# Patient Record
Sex: FEMALE | Race: WHITE
[De-identification: names, ages, dates, MRNs, and addresses within clinical notes are randomized per-mention and may not be internally consistent; named-entity substitution may affect disease eponyms.]

---

## 2019-06-18 ENCOUNTER — HOSPITAL ENCOUNTER (OUTPATIENT)
Dept: HOSPITAL 95 - LAB SHORT | Age: 79
Discharge: HOME | End: 2019-06-18
Attending: PHYSICIAN ASSISTANT
Payer: MEDICARE

## 2019-06-18 DIAGNOSIS — N39.0: Primary | ICD-10-CM

## 2020-06-16 ENCOUNTER — HOSPITAL ENCOUNTER (OUTPATIENT)
Dept: HOSPITAL 95 - LAB | Age: 80
Discharge: HOME | End: 2020-06-16
Attending: NURSE PRACTITIONER
Payer: MEDICARE

## 2020-06-16 DIAGNOSIS — R30.9: Primary | ICD-10-CM

## 2020-10-14 ENCOUNTER — HOSPITAL ENCOUNTER (OUTPATIENT)
Dept: HOSPITAL 95 - LAB SHORT | Age: 80
Discharge: HOME | End: 2020-10-14
Attending: PHYSICIAN ASSISTANT
Payer: MEDICARE

## 2020-10-14 DIAGNOSIS — R42: Primary | ICD-10-CM

## 2020-10-14 LAB
ANION GAP SERPL CALCULATED.4IONS-SCNC: 8 MMOL/L (ref 6–16)
BASOPHILS # BLD AUTO: 0.02 K/MM3 (ref 0–0.23)
BASOPHILS NFR BLD AUTO: 0 % (ref 0–2)
BUN SERPL-MCNC: 15 MG/DL (ref 8–24)
CALCIUM SERPL-MCNC: 9.9 MG/DL (ref 8.5–10.1)
CHLORIDE SERPL-SCNC: 89 MMOL/L (ref 98–108)
CO2 SERPL-SCNC: 29 MMOL/L (ref 21–32)
CREAT SERPL-MCNC: 0.68 MG/DL (ref 0.4–1)
DEPRECATED RDW RBC AUTO: 39.7 FL (ref 35.1–46.3)
EOSINOPHIL # BLD AUTO: 0.08 K/MM3 (ref 0–0.68)
EOSINOPHIL NFR BLD AUTO: 1 % (ref 0–6)
ERYTHROCYTE [DISTWIDTH] IN BLOOD BY AUTOMATED COUNT: 12.6 % (ref 11.7–14.2)
GLUCOSE SERPL-MCNC: 100 MG/DL (ref 70–99)
HCT VFR BLD AUTO: 41.7 % (ref 33–51)
HGB BLD-MCNC: 14.9 G/DL (ref 11.5–16)
IMM GRANULOCYTES # BLD AUTO: 0.05 K/MM3 (ref 0–0.1)
IMM GRANULOCYTES NFR BLD AUTO: 0 % (ref 0–1)
LYMPHOCYTES # BLD AUTO: 1.35 K/MM3 (ref 0.84–5.2)
LYMPHOCYTES NFR BLD AUTO: 12 % (ref 21–46)
MCHC RBC AUTO-ENTMCNC: 35.7 G/DL (ref 31.5–36.5)
MCV RBC AUTO: 86 FL (ref 80–100)
MONOCYTES # BLD AUTO: 0.79 K/MM3 (ref 0.16–1.47)
MONOCYTES NFR BLD AUTO: 7 % (ref 4–13)
NEUTROPHILS # BLD AUTO: 9.3 K/MM3 (ref 1.96–9.15)
NEUTROPHILS NFR BLD AUTO: 80 % (ref 41–73)
NRBC # BLD AUTO: 0 K/MM3 (ref 0–0.02)
NRBC BLD AUTO-RTO: 0 /100 WBC (ref 0–0.2)
PLATELET # BLD AUTO: 204 K/MM3 (ref 150–400)
POTASSIUM SERPL-SCNC: 3 MMOL/L (ref 3.5–5.5)
SODIUM SERPL-SCNC: 126 MMOL/L (ref 136–145)
TROPONIN I SERPL-MCNC: <0.017 NG/ML (ref 0–0.04)

## 2022-07-10 ENCOUNTER — HOSPITAL ENCOUNTER (INPATIENT)
Dept: HOSPITAL 95 - ER | Age: 82
LOS: 4 days | Discharge: NURSING, REHAB OR LONG-TERM CARE | DRG: 536 | End: 2022-07-14
Attending: INTERNAL MEDICINE | Admitting: INTERNAL MEDICINE
Payer: MEDICARE

## 2022-07-10 VITALS — WEIGHT: 161.82 LBS | HEIGHT: 63 IN | BODY MASS INDEX: 28.67 KG/M2

## 2022-07-10 DIAGNOSIS — E03.2: ICD-10-CM

## 2022-07-10 DIAGNOSIS — Z88.0: ICD-10-CM

## 2022-07-10 DIAGNOSIS — K21.9: ICD-10-CM

## 2022-07-10 DIAGNOSIS — D64.9: ICD-10-CM

## 2022-07-10 DIAGNOSIS — M71.9: ICD-10-CM

## 2022-07-10 DIAGNOSIS — I10: ICD-10-CM

## 2022-07-10 DIAGNOSIS — Z79.899: ICD-10-CM

## 2022-07-10 DIAGNOSIS — I95.1: ICD-10-CM

## 2022-07-10 DIAGNOSIS — G25.81: ICD-10-CM

## 2022-07-10 DIAGNOSIS — S32.592A: ICD-10-CM

## 2022-07-10 DIAGNOSIS — F32.A: ICD-10-CM

## 2022-07-10 DIAGNOSIS — I71.2: ICD-10-CM

## 2022-07-10 DIAGNOSIS — E03.9: ICD-10-CM

## 2022-07-10 DIAGNOSIS — Z88.8: ICD-10-CM

## 2022-07-10 DIAGNOSIS — Z98.41: ICD-10-CM

## 2022-07-10 DIAGNOSIS — Z20.822: ICD-10-CM

## 2022-07-10 DIAGNOSIS — Z98.51: ICD-10-CM
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
## 2022-07-10 DIAGNOSIS — M06.052: ICD-10-CM

## 2022-07-10 DIAGNOSIS — T40.2X5A: ICD-10-CM

## 2022-07-10 DIAGNOSIS — E78.5: ICD-10-CM

## 2022-07-10 DIAGNOSIS — S32.512A: Primary | ICD-10-CM

## 2022-07-10 DIAGNOSIS — E06.4: ICD-10-CM

## 2022-07-10 DIAGNOSIS — G89.29: ICD-10-CM

## 2022-07-10 DIAGNOSIS — Z90.49: ICD-10-CM

## 2022-07-10 DIAGNOSIS — E87.6: ICD-10-CM

## 2022-07-10 DIAGNOSIS — F41.9: ICD-10-CM

## 2022-07-10 DIAGNOSIS — N39.0: ICD-10-CM

## 2022-07-10 DIAGNOSIS — R11.0: ICD-10-CM

## 2022-07-10 DIAGNOSIS — R32: ICD-10-CM

## 2022-07-10 DIAGNOSIS — M81.0: ICD-10-CM

## 2022-07-10 DIAGNOSIS — E87.1: ICD-10-CM

## 2022-07-10 DIAGNOSIS — B96.20: ICD-10-CM

## 2022-07-10 DIAGNOSIS — Z87.891: ICD-10-CM

## 2022-07-10 LAB
PROT UR STRIP-MCNC: (no result) MG/DL
RBC #/AREA URNS HPF: (no result) /HPF (ref 0–2)
SP GR SPEC: 1.01 (ref 1–1.02)
URN SPEC COLLECT METH UR: (no result)
UROBILINOGEN UR STRIP-MCNC: (no result) MG/DL
WBC #/AREA URNS HPF: (no result) /HPF (ref 0–5)

## 2022-07-10 PROCEDURE — G0378 HOSPITAL OBSERVATION PER HR: HCPCS

## 2022-07-10 PROCEDURE — A9270 NON-COVERED ITEM OR SERVICE: HCPCS

## 2022-07-11 LAB
ALBUMIN SERPL BCP-MCNC: 2.9 G/DL (ref 3.4–5)
ALBUMIN/GLOB SERPL: 0.9 {RATIO} (ref 0.8–1.8)
ALT SERPL W P-5'-P-CCNC: 27 U/L (ref 12–78)
ANION GAP SERPL CALCULATED.4IONS-SCNC: 10 MMOL/L (ref 6–16)
ANION GAP SERPL CALCULATED.4IONS-SCNC: 6 MMOL/L (ref 6–16)
AST SERPL W P-5'-P-CCNC: 22 U/L (ref 12–37)
BASOPHILS # BLD AUTO: 0.01 K/MM3 (ref 0–0.23)
BASOPHILS NFR BLD AUTO: 0 % (ref 0–2)
BILIRUB SERPL-MCNC: 0.9 MG/DL (ref 0.1–1)
BUN SERPL-MCNC: 11 MG/DL (ref 8–24)
BUN SERPL-MCNC: 12 MG/DL (ref 8–24)
CALCIUM SERPL-MCNC: 9.3 MG/DL (ref 8.5–10.1)
CALCIUM SERPL-MCNC: 9.3 MG/DL (ref 8.5–10.1)
CHLORIDE SERPL-SCNC: 84 MMOL/L (ref 98–108)
CHLORIDE SERPL-SCNC: 84 MMOL/L (ref 98–108)
CO2 SERPL-SCNC: 27 MMOL/L (ref 21–32)
CO2 SERPL-SCNC: 29 MMOL/L (ref 21–32)
CREAT SERPL-MCNC: 0.45 MG/DL (ref 0.4–1)
CREAT SERPL-MCNC: 0.5 MG/DL (ref 0.4–1)
DEPRECATED RDW RBC AUTO: 41.2 FL (ref 35.1–46.3)
EOSINOPHIL # BLD AUTO: 0.02 K/MM3 (ref 0–0.68)
EOSINOPHIL NFR BLD AUTO: 0 % (ref 0–6)
ERYTHROCYTE [DISTWIDTH] IN BLOOD BY AUTOMATED COUNT: 13.2 % (ref 11.7–14.2)
GLOBULIN SER CALC-MCNC: 3.2 G/DL (ref 2.2–4)
GLUCOSE SERPL-MCNC: 125 MG/DL (ref 70–99)
GLUCOSE SERPL-MCNC: 142 MG/DL (ref 70–99)
HCT VFR BLD AUTO: 33.4 % (ref 33–51)
HGB BLD-MCNC: 12.1 G/DL (ref 11.5–16)
IMM GRANULOCYTES # BLD AUTO: 0.05 K/MM3 (ref 0–0.1)
IMM GRANULOCYTES NFR BLD AUTO: 1 % (ref 0–1)
LYMPHOCYTES # BLD AUTO: 0.66 K/MM3 (ref 0.84–5.2)
LYMPHOCYTES NFR BLD AUTO: 8 % (ref 21–46)
MAGNESIUM SERPL-MCNC: 1.5 MG/DL (ref 1.6–2.4)
MCHC RBC AUTO-ENTMCNC: 36.2 G/DL (ref 31.5–36.5)
MCV RBC AUTO: 86 FL (ref 80–100)
MONOCYTES # BLD AUTO: 0.45 K/MM3 (ref 0.16–1.47)
MONOCYTES NFR BLD AUTO: 6 % (ref 4–13)
NEUTROPHILS # BLD AUTO: 7 K/MM3 (ref 1.96–9.15)
NEUTROPHILS NFR BLD AUTO: 86 % (ref 41–73)
NRBC # BLD AUTO: 0 K/MM3 (ref 0–0.02)
NRBC BLD AUTO-RTO: 0 /100 WBC (ref 0–0.2)
OSMOLALITY SERPL: 249 MOS/KG (ref 275–300)
PLATELET # BLD AUTO: 155 K/MM3 (ref 150–400)
POTASSIUM SERPL-SCNC: 3 MMOL/L (ref 3.5–5.5)
POTASSIUM SERPL-SCNC: 3.4 MMOL/L (ref 3.5–5.5)
PROT SERPL-MCNC: 6.1 G/DL (ref 6.4–8.2)
SODIUM SERPL-SCNC: 119 MMOL/L (ref 136–145)
SODIUM SERPL-SCNC: 121 MMOL/L (ref 136–145)
TSH SERPL DL<=0.005 MIU/L-ACNC: 0.18 UIU/ML (ref 0.36–4.8)

## 2022-07-11 NOTE — NUR
SHIFT SUMMARY: PATIENT REPORTS PAIN ONLY WITH ACTIVITY AND REFUSED TYLENOL.
ZOFRAN WAS GIVEN FOR NAUSEA WITH GOOD EFFECT. EGG CRATE WAS PLACED ON THE BED
TO PREVENT SKIN BREAKDOWN. REDDNESS IS OBSERVED UNDER BREASTS.

## 2022-07-11 NOTE — NUR
SHIFT SUMMARY
S/P PUBIC FX, A/O X4, VSS, TOLERATING PO, NOT ABLE TO AMBULATE TODAY WITH
PT/OT R/T PAIN. REBOLLEDO IN PLACE DRAINING DARK YELLOW URINE, 2 ATTEMPTS TO HAVE
A BM ON A BEDPAN WITH NO SUCCESS. ABLE TO MAKE NEEDS KNOWN. CALL LIGHT IN
REACH, WILL CTM AND REPORT TO ONCOMING NOC RN.

## 2022-07-11 NOTE — NUR
ADMISSION: LATE ENTRY FOR 07/10/22 @ 2100: PATIENT IS RECIEVED FROM ER VIA
STRETCHER. A LATERAL TRANSFER WITH ASSIST OF 4 TO THE BED. PATIENT REPORTS
NAUSEA. PAIN ONLY WITH ACTIVITY. REBOLLEDO IS IN PLACE. PATIENT IS ORIENTED TO
ROOM AND CALL BELL.

## 2022-07-12 LAB
ANION GAP SERPL CALCULATED.4IONS-SCNC: 7 MMOL/L (ref 6–16)
BASOPHILS # BLD AUTO: 0.01 K/MM3 (ref 0–0.23)
BASOPHILS NFR BLD AUTO: 0 % (ref 0–2)
BUN SERPL-MCNC: 9 MG/DL (ref 8–24)
CALCIUM SERPL-MCNC: 9.4 MG/DL (ref 8.5–10.1)
CHLORIDE SERPL-SCNC: 87 MMOL/L (ref 98–108)
CO2 SERPL-SCNC: 29 MMOL/L (ref 21–32)
CREAT SERPL-MCNC: 0.49 MG/DL (ref 0.4–1)
DEPRECATED RDW RBC AUTO: 43.1 FL (ref 35.1–46.3)
EOSINOPHIL # BLD AUTO: 0.08 K/MM3 (ref 0–0.68)
EOSINOPHIL NFR BLD AUTO: 1 % (ref 0–6)
ERYTHROCYTE [DISTWIDTH] IN BLOOD BY AUTOMATED COUNT: 13.3 % (ref 11.7–14.2)
GLUCOSE SERPL-MCNC: 110 MG/DL (ref 70–99)
HCT VFR BLD AUTO: 30.8 % (ref 33–51)
HGB BLD-MCNC: 10.7 G/DL (ref 11.5–16)
IMM GRANULOCYTES # BLD AUTO: 0.06 K/MM3 (ref 0–0.1)
IMM GRANULOCYTES NFR BLD AUTO: 1 % (ref 0–1)
LYMPHOCYTES # BLD AUTO: 0.93 K/MM3 (ref 0.84–5.2)
LYMPHOCYTES NFR BLD AUTO: 13 % (ref 21–46)
MAGNESIUM SERPL-MCNC: 2.1 MG/DL (ref 1.6–2.4)
MCHC RBC AUTO-ENTMCNC: 34.7 G/DL (ref 31.5–36.5)
MCV RBC AUTO: 88 FL (ref 80–100)
MONOCYTES # BLD AUTO: 0.64 K/MM3 (ref 0.16–1.47)
MONOCYTES NFR BLD AUTO: 9 % (ref 4–13)
NEUTROPHILS # BLD AUTO: 5.51 K/MM3 (ref 1.96–9.15)
NEUTROPHILS NFR BLD AUTO: 76 % (ref 41–73)
NRBC # BLD AUTO: 0 K/MM3 (ref 0–0.02)
NRBC BLD AUTO-RTO: 0 /100 WBC (ref 0–0.2)
PHOSPHATE SERPL-MCNC: 2.4 MG/DL (ref 2.5–4.9)
PLATELET # BLD AUTO: 144 K/MM3 (ref 150–400)
POTASSIUM SERPL-SCNC: 3.6 MMOL/L (ref 3.5–5.5)
SODIUM SERPL-SCNC: 123 MMOL/L (ref 136–145)

## 2022-07-12 NOTE — NUR
SHIFT SUMMARY
 
PATIENT IS ALERT AND ORIENTED. PATIENT HAS BEEN PLEASENT AND COOPERATIVE WITH
CARE. PATIENT HAS HAD NO ACUTE EVENTS THIS SHIFT. PATIENT HAS A REBOLLEDO DRAINING
PRANAY URINE TO GRAVITY. PATIENT WAS ADMITTED FOR HIP FX. PATIENT REPORTS PAIN
WITH MOVEMENT. PATIENT HAS NOT COMPLAINED OF PAIN, NAUSEA, SOB OR VOMITTING
THIS SHIFT. PATIENT IS ABLE TO MAKE NEEDS KNOWN. PATIENTS  VISITED FOR
AWHILE THIS MORNING. VITAL SIGNS REVIEWED. CALL LIGHT IN PLACE. BED IN LOWEST
AND LOCKED POSITION. WILL MONITOR UNTIL SHIFT CHANGE.

## 2022-07-12 NOTE — NUR
SHIFT SUMMARY: PATIENT REPORTS PAIN 8/10 WITH ACTIVITY BUT 0 PAIN WHILE LYING
STILL. TYLENOL WAS GIVEN WITH GOOD EFFECT X2. REDDNESS BETWEEN BREASTS IS
TREATED WITH ANTIFUGAL POWDER. REBOLLEDO IS DRAINING CLEAR YELLOW URINE. SODIUM
PILL GIVEN AT HS CAUSED INCREASE NAUSEA. DR JAIN WAS NOTIFIED AND ORDER FOR
PHENERGAN SUPP WAS OBTAINED AND MED WAS GIVEN WITH GOOD EFFECT. PATIENT
REPORTS SLEEPING WELL TONIGHT.

## 2022-07-13 LAB
ALBUMIN SERPL BCP-MCNC: 2.6 G/DL (ref 3.4–5)
ALBUMIN/GLOB SERPL: 0.8 {RATIO} (ref 0.8–1.8)
ALT SERPL W P-5'-P-CCNC: 21 U/L (ref 12–78)
ANION GAP SERPL CALCULATED.4IONS-SCNC: 7 MMOL/L (ref 6–16)
AST SERPL W P-5'-P-CCNC: 15 U/L (ref 12–37)
BASOPHILS # BLD AUTO: 0.03 K/MM3 (ref 0–0.23)
BASOPHILS NFR BLD AUTO: 0 % (ref 0–2)
BILIRUB SERPL-MCNC: 0.8 MG/DL (ref 0.1–1)
BUN SERPL-MCNC: 14 MG/DL (ref 8–24)
CALCIUM SERPL-MCNC: 9.1 MG/DL (ref 8.5–10.1)
CHLORIDE SERPL-SCNC: 90 MMOL/L (ref 98–108)
CO2 SERPL-SCNC: 29 MMOL/L (ref 21–32)
CREAT SERPL-MCNC: 0.43 MG/DL (ref 0.4–1)
DEPRECATED RDW RBC AUTO: 43.5 FL (ref 35.1–46.3)
EOSINOPHIL # BLD AUTO: 0.08 K/MM3 (ref 0–0.68)
EOSINOPHIL NFR BLD AUTO: 1 % (ref 0–6)
ERYTHROCYTE [DISTWIDTH] IN BLOOD BY AUTOMATED COUNT: 13.3 % (ref 11.7–14.2)
GLOBULIN SER CALC-MCNC: 3.1 G/DL (ref 2.2–4)
GLUCOSE SERPL-MCNC: 106 MG/DL (ref 70–99)
HCT VFR BLD AUTO: 31 % (ref 33–51)
HGB BLD-MCNC: 10.8 G/DL (ref 11.5–16)
IMM GRANULOCYTES # BLD AUTO: 0.11 K/MM3 (ref 0–0.1)
IMM GRANULOCYTES NFR BLD AUTO: 1 % (ref 0–1)
LYMPHOCYTES # BLD AUTO: 1.08 K/MM3 (ref 0.84–5.2)
LYMPHOCYTES NFR BLD AUTO: 12 % (ref 21–46)
MCHC RBC AUTO-ENTMCNC: 34.8 G/DL (ref 31.5–36.5)
MCV RBC AUTO: 89 FL (ref 80–100)
MONOCYTES # BLD AUTO: 0.85 K/MM3 (ref 0.16–1.47)
MONOCYTES NFR BLD AUTO: 9 % (ref 4–13)
NEUTROPHILS # BLD AUTO: 7.06 K/MM3 (ref 1.96–9.15)
NEUTROPHILS NFR BLD AUTO: 77 % (ref 41–73)
NRBC # BLD AUTO: 0 K/MM3 (ref 0–0.02)
NRBC BLD AUTO-RTO: 0 /100 WBC (ref 0–0.2)
PLATELET # BLD AUTO: 159 K/MM3 (ref 150–400)
POTASSIUM SERPL-SCNC: 3.4 MMOL/L (ref 3.5–5.5)
PROT SERPL-MCNC: 5.7 G/DL (ref 6.4–8.2)
SODIUM SERPL-SCNC: 126 MMOL/L (ref 136–145)

## 2022-07-13 NOTE — NUR
SHIFT SUMMARY
 BY TO SEE PT THIS EVENING. PT DENIES PAIN FOR THE MOST PART. DOES
REPORT SOME GENERALIZED DISCOMFORT BUT BELIEVES IT IS MOSTLY RELATED TO HAVING
TO SPEND SO MUCH TIME IN BED. MEDICATED X 1 THIS EVENING AND PT SLEPT WELL FOR
SOME TIME FOLLOWING. PT DID REPORT SOME NAUSEA, MOSTLY WITH MOVEMENT. ALSO
MEDICATED X 1 WITH ZOFRAN. REBOLLEDO CATHETER PATENT AND DRAINING. VITAL SIGNS
STABLE. NO ACUTE CHANGES THIS EVENING.

## 2022-07-13 NOTE — NUR
RECEIVED BEDSIDE REPORT. PT LYING IN BED WITH EYES CLOSED. RESP E/U ON RA.
AWAKENS EASY TO NAME. NO NEEDS AT THIS TIME. WILL PROVIDE CARE T/O SHIFT. CALL
LT IN REACH.

## 2022-07-13 NOTE — NUR
SHIFT SUMMARY
 
PATIENT IS ALERT AND ORIENTED. PATIENT HAS BEEN PLEASENT AND COOPERATIVE WITH
CARE. PATIENT HAS HAD NO ACUTE EVENTS THIS SHIFT. PATIENT HAS NOT COMPLAINED
OF PAIN OR SOB THIS SHIFT. PATIENT HAS HAD N/V THIS SHIFT. VITAL SIGNS
REVIEWED. BED IN LOCKED AND LOWEST POSITION. CALL LIGHT IN PLACE. WILL MONITOR
UNTIL SHIFT CHANGE.

## 2022-07-14 LAB
ANION GAP SERPL CALCULATED.4IONS-SCNC: 9 MMOL/L (ref 6–16)
BUN SERPL-MCNC: 11 MG/DL (ref 8–24)
CALCIUM SERPL-MCNC: 9.1 MG/DL (ref 8.5–10.1)
CHLORIDE SERPL-SCNC: 92 MMOL/L (ref 98–108)
CO2 SERPL-SCNC: 29 MMOL/L (ref 21–32)
CREAT SERPL-MCNC: 0.45 MG/DL (ref 0.4–1)
FLUAV RNA SPEC QL NAA+PROBE: NEGATIVE
FLUBV RNA SPEC QL NAA+PROBE: NEGATIVE
GLUCOSE SERPL-MCNC: 91 MG/DL (ref 70–99)
POTASSIUM SERPL-SCNC: 3.7 MMOL/L (ref 3.5–5.5)
RSV RNA SPEC QL NAA+PROBE: NEGATIVE
SARS-COV-2 RNA RESP QL NAA+PROBE: NEGATIVE
SODIUM SERPL-SCNC: 130 MMOL/L (ref 136–145)

## 2022-07-14 NOTE — NUR
DISCHARE SUMMARY
 
PATIENT IS ALERT AND ORIENTED. PATIENT IS A ONE PERSON ASSIST TO COMMODE.
PATIENT WAS DISCHARGED TO [facility]. REPORT TO [facility] RN WAS
GIVEN. REBOLLEDO REMOVED WNL. IV REMOVED WNL. NO ACUTE EVENTS THIS SHIFT. TRANSFER
SERVICE PICKED PATIENT UP AND TRANSPORTED HER TO SNF.

## 2022-07-14 NOTE — NUR
SHIFT SUMMARY:
A/O. ABLE TO STATE NEEDS APPROPRIATELY. ON 2L VIA NC. DID NOT NEED TO MEDICATE
PT FOR NAUSEA THIS SHIFT. STATES PAIN IS BETTER EVEN WITH SOME MOVEMENT. 5MG
AMBIEN GIVEN PER PT REQUEST. PT RESTED WELL. REBOLLEDO PATENT AND DRAINING YELLOW
URINE. NO ACUTE CHANGES. WILL CONTINUE TO PROVIDE CARE UNTIL SHIFT REPORT.

## 2022-10-16 ENCOUNTER — HOSPITAL ENCOUNTER (OUTPATIENT)
Dept: HOSPITAL 95 - LAB | Age: 82
Discharge: HOME | End: 2022-10-16
Attending: PHYSICIAN ASSISTANT
Payer: MEDICARE

## 2022-10-16 DIAGNOSIS — R06.00: Primary | ICD-10-CM

## 2022-10-16 LAB
ALBUMIN SERPL BCP-MCNC: 3.6 G/DL (ref 3.4–5)
ALBUMIN/GLOB SERPL: 1 {RATIO} (ref 0.8–1.8)
ALT SERPL W P-5'-P-CCNC: 21 U/L (ref 12–78)
ANION GAP SERPL CALCULATED.4IONS-SCNC: 8 MMOL/L (ref 6–16)
AST SERPL W P-5'-P-CCNC: 21 U/L (ref 12–37)
BASOPHILS # BLD AUTO: 0.07 K/MM3 (ref 0–0.23)
BASOPHILS NFR BLD AUTO: 1 % (ref 0–2)
BILIRUB SERPL-MCNC: 0.7 MG/DL (ref 0.1–1)
BUN SERPL-MCNC: 16 MG/DL (ref 8–24)
CALCIUM SERPL-MCNC: 9.3 MG/DL (ref 8.5–10.1)
CHLORIDE SERPL-SCNC: 100 MMOL/L (ref 98–108)
CO2 SERPL-SCNC: 29 MMOL/L (ref 21–32)
CREAT SERPL-MCNC: 0.71 MG/DL (ref 0.4–1)
DEPRECATED RDW RBC AUTO: 51.8 FL (ref 35.1–46.3)
EOSINOPHIL # BLD AUTO: 0.17 K/MM3 (ref 0–0.68)
EOSINOPHIL NFR BLD AUTO: 2 % (ref 0–6)
ERYTHROCYTE [DISTWIDTH] IN BLOOD BY AUTOMATED COUNT: 14.9 % (ref 11.7–14.2)
GLOBULIN SER CALC-MCNC: 3.6 G/DL (ref 2.2–4)
GLUCOSE SERPL-MCNC: 118 MG/DL (ref 70–99)
HCT VFR BLD AUTO: 43.8 % (ref 33–51)
HGB BLD-MCNC: 14.7 G/DL (ref 11.5–16)
IMM GRANULOCYTES # BLD AUTO: 0.06 K/MM3 (ref 0–0.1)
IMM GRANULOCYTES NFR BLD AUTO: 1 % (ref 0–1)
LYMPHOCYTES # BLD AUTO: 1.79 K/MM3 (ref 0.84–5.2)
LYMPHOCYTES NFR BLD AUTO: 18 % (ref 21–46)
MCHC RBC AUTO-ENTMCNC: 33.6 G/DL (ref 31.5–36.5)
MCV RBC AUTO: 94 FL (ref 80–100)
MONOCYTES # BLD AUTO: 0.71 K/MM3 (ref 0.16–1.47)
MONOCYTES NFR BLD AUTO: 7 % (ref 4–13)
NEUTROPHILS # BLD AUTO: 7.02 K/MM3 (ref 1.96–9.15)
NEUTROPHILS NFR BLD AUTO: 72 % (ref 41–73)
NRBC # BLD AUTO: 0 K/MM3 (ref 0–0.02)
NRBC BLD AUTO-RTO: 0 /100 WBC (ref 0–0.2)
PLATELET # BLD AUTO: 228 K/MM3 (ref 150–400)
POTASSIUM SERPL-SCNC: 4.1 MMOL/L (ref 3.5–5.5)
PROT SERPL-MCNC: 7.2 G/DL (ref 6.4–8.2)
SODIUM SERPL-SCNC: 137 MMOL/L (ref 136–145)

## 2022-10-18 ENCOUNTER — HOSPITAL ENCOUNTER (OUTPATIENT)
Dept: HOSPITAL 95 - LAB SHORT | Age: 82
Discharge: HOME | End: 2022-10-18
Attending: PHYSICIAN ASSISTANT
Payer: MEDICARE

## 2022-10-18 DIAGNOSIS — I50.9: Primary | ICD-10-CM

## 2022-10-18 LAB
ALBUMIN SERPL BCP-MCNC: 3.3 G/DL (ref 3.4–5)
ALBUMIN/GLOB SERPL: 1 {RATIO} (ref 0.8–1.8)
ALT SERPL W P-5'-P-CCNC: 21 U/L (ref 12–78)
ANION GAP SERPL CALCULATED.4IONS-SCNC: 7 MMOL/L (ref 6–16)
AST SERPL W P-5'-P-CCNC: 24 U/L (ref 12–37)
BILIRUB SERPL-MCNC: 1.1 MG/DL (ref 0.1–1)
BUN SERPL-MCNC: 18 MG/DL (ref 8–24)
CALCIUM SERPL-MCNC: 9.2 MG/DL (ref 8.5–10.1)
CHLORIDE SERPL-SCNC: 96 MMOL/L (ref 98–108)
CO2 SERPL-SCNC: 30 MMOL/L (ref 21–32)
CREAT SERPL-MCNC: 0.73 MG/DL (ref 0.4–1)
GLOBULIN SER CALC-MCNC: 3.4 G/DL (ref 2.2–4)
GLUCOSE SERPL-MCNC: 133 MG/DL (ref 70–99)
POTASSIUM SERPL-SCNC: 3.2 MMOL/L (ref 3.5–5.5)
PROT SERPL-MCNC: 6.7 G/DL (ref 6.4–8.2)
SODIUM SERPL-SCNC: 133 MMOL/L (ref 136–145)

## 2024-09-30 ENCOUNTER — HOSPITAL ENCOUNTER (OUTPATIENT)
Dept: HOSPITAL 95 - LAB SHORT | Age: 84
End: 2024-09-30
Payer: MEDICARE

## 2024-09-30 DIAGNOSIS — H10.31: Primary | ICD-10-CM

## 2025-07-30 ENCOUNTER — HOSPITAL ENCOUNTER (OUTPATIENT)
Dept: HOSPITAL 95 - LAB | Age: 85
Discharge: HOME | End: 2025-07-30
Attending: FAMILY MEDICINE
Payer: MEDICARE

## 2025-07-30 DIAGNOSIS — E87.1: ICD-10-CM

## 2025-07-30 DIAGNOSIS — E03.9: Primary | ICD-10-CM

## 2025-07-30 LAB
ALBUMIN SERPL BCP-MCNC: 3.5 G/DL (ref 3.4–5)
ALBUMIN/GLOB SERPL: 0.9 {RATIO} (ref 0.8–1.8)
ALP SERPL-CCNC: 81 U/L (ref 40–126)
ALT SERPL W P-5'-P-CCNC: 20 U/L (ref 12–78)
ANION GAP SERPL CALCULATED.4IONS-SCNC: 7 MMOL/L (ref 3–11)
AST SERPL W P-5'-P-CCNC: 19 U/L (ref 12–37)
BASOPHILS # BLD AUTO: 0.06 K/MM3 (ref 0–0.23)
BASOPHILS # BLD: (no result) K/MM3 (ref 0–0.23)
BASOPHILS NFR BLD AUTO: 1 % (ref 0–2)
BASOPHILS NFR BLD: (no result) % (ref 0–2)
BILIRUB SERPL-MCNC: 0.7 MG/DL (ref 0.1–1)
BLASTS NFR BLD MANUAL: (no result) % (ref 0–0)
BUN SERPL-MCNC: 20 MG/DL (ref 8–24)
BUN/CREAT SERPL: 24.1 % (ref 12–20)
CALCIUM SERPL-MCNC: 10 MG/DL (ref 8.5–10.1)
CHLORIDE SERPL-SCNC: 103 MMOL/L (ref 98–108)
CO2 SERPL-SCNC: 34 MMOL/L (ref 21–32)
CREAT SERPL-MCNC: 0.83 MG/DL (ref 0.4–1)
DEPRECATED RDW RBC AUTO: 48.4 FL (ref 35.1–46.3)
EOSINOPHIL # BLD AUTO: 0.21 K/MM3 (ref 0–0.68)
EOSINOPHIL # BLD: (no result) K/MM3 (ref 0–0.68)
EOSINOPHIL NFR BLD AUTO: 3 % (ref 0–6)
EOSINOPHIL NFR BLD: (no result) % (ref 0–6)
ERYTHROCYTE [DISTWIDTH] IN BLOOD BY AUTOMATED COUNT: 14.6 % (ref 11.7–14.2)
GFR SERPL CREATININE-BSD FRML MDRD: 70 ML/MIN/{1.73_M2} (ref 60–?)
GLOBULIN SER CALC-MCNC: 4 G/DL (ref 2.2–4)
GLUCOSE SERPL-MCNC: 106 MG/DL (ref 70–99)
HCT VFR BLD AUTO: 39.4 % (ref 33–51)
HGB BLD-MCNC: 12.7 G/DL (ref 11.5–16)
IMM GRANULOCYTES # BLD AUTO: 0.02 K/MM3 (ref 0–0.1)
IMM GRANULOCYTES NFR BLD AUTO: 0 % (ref 0–1)
LYMPHOCYTES # BLD AUTO: 1.84 K/MM3 (ref 0.84–5.2)
LYMPHOCYTES # BLD: (no result) K/MM3 (ref 0.84–5.2)
LYMPHOCYTES NFR BLD AUTO: 24 % (ref 21–46)
LYMPHOCYTES NFR BLD: (no result) % (ref 21–46)
MCH RBC QN AUTO: 29.5 PG (ref 26–34)
MCHC RBC AUTO-ENTMCNC: 32.2 G/DL (ref 31.5–36.5)
MCV RBC AUTO: 92 FL (ref 80–100)
METAMYELOCYTES # BLD MANUAL: (no result) K/MM3 (ref 0–0)
METAMYELOCYTES NFR BLD MANUAL: (no result) % (ref 0–0)
MONOCYTES # BLD AUTO: 0.57 K/MM3 (ref 0.16–1.47)
MONOCYTES # BLD: (no result) K/MM3 (ref 0.16–1.47)
MONOCYTES NFR BLD AUTO: 8 % (ref 4–13)
MONOCYTES NFR BLD: (no result) % (ref 4–13)
MYELOCYTES # BLD MANUAL: (no result) K/MM3 (ref 0–0)
MYELOCYTES NFR BLD MANUAL: (no result) % (ref 0–0)
NEUTROPHILS # BLD AUTO: 4.93 K/MM3 (ref 1.96–9.15)
NEUTROPHILS NFR BLD AUTO: 65 % (ref 41–73)
NEUTS BAND NFR BLD MANUAL: (no result) % (ref 0–8)
NEUTS SEG # BLD MANUAL: (no result) K/MM3 (ref 1.96–9.15)
NEUTS SEG NFR BLD MANUAL: (no result) % (ref 41–73)
NRBC # BLD AUTO: 0 K/MM3 (ref 0–0.02)
NRBC BLD AUTO-RTO: 0 /100 WBC (ref 0–0.2)
PLASMA CELLS # BLD MANUAL: (no result) K/MM3 (ref 0–0)
PLASMA CELLS NFR BLD: (no result) % (ref 0–0)
PLATELET # BLD AUTO: 168 K/MM3 (ref 150–400)
PMV BLD AUTO: 11 FL (ref 9.1–12.4)
POTASSIUM SERPL-SCNC: 3.7 MMOL/L (ref 3.5–5.5)
PROMYELOCYTES # BLD MANUAL: (no result) K/MM3 (ref 0–0)
PROMYELOCYTES NFR BLD MANUAL: (no result) % (ref 0–0)
PROT SERPL-MCNC: 7.5 G/DL (ref 6.4–8.2)
RBC # BLD AUTO: 4.3 M/MM3 (ref 3.8–5.2)
SODIUM SERPL-SCNC: 140 MMOL/L (ref 136–145)
T4 FREE SERPL-MCNC: 1.37 NG/DL (ref 0.7–1.6)
TOTAL CELLS COUNTED BLD: (no result)
TSH SERPL DL<=0.005 MIU/L-ACNC: 1.06 UIU/ML (ref 0.36–4.8)
VARIANT LYMPHS NFR BLD MANUAL: (no result) % (ref 0–0)
WBC # BLD AUTO: 7.63 K/MM3 (ref 4–11.3)
WBC OTHER NFR BLD MANUAL: (no result) % (ref 0–0)